# Patient Record
Sex: FEMALE | Race: WHITE
[De-identification: names, ages, dates, MRNs, and addresses within clinical notes are randomized per-mention and may not be internally consistent; named-entity substitution may affect disease eponyms.]

---

## 2020-07-30 ENCOUNTER — HOSPITAL ENCOUNTER (EMERGENCY)
Dept: HOSPITAL 11 - JP.ED | Age: 35
Discharge: HOME | End: 2020-07-30
Payer: COMMERCIAL

## 2020-07-30 DIAGNOSIS — R07.89: Primary | ICD-10-CM

## 2020-07-30 DIAGNOSIS — Z88.2: ICD-10-CM

## 2020-07-30 DIAGNOSIS — Z88.0: ICD-10-CM

## 2020-07-30 DIAGNOSIS — Z88.1: ICD-10-CM

## 2020-07-30 DIAGNOSIS — Z79.899: ICD-10-CM

## 2020-07-30 DIAGNOSIS — Z87.891: ICD-10-CM

## 2020-07-30 PROCEDURE — 36415 COLL VENOUS BLD VENIPUNCTURE: CPT

## 2020-07-30 PROCEDURE — 71046 X-RAY EXAM CHEST 2 VIEWS: CPT

## 2020-07-30 PROCEDURE — 85025 COMPLETE CBC W/AUTO DIFF WBC: CPT

## 2020-07-30 PROCEDURE — 93005 ELECTROCARDIOGRAM TRACING: CPT

## 2020-07-30 PROCEDURE — 84484 ASSAY OF TROPONIN QUANT: CPT

## 2020-07-30 PROCEDURE — 96372 THER/PROPH/DIAG INJ SC/IM: CPT

## 2020-07-30 PROCEDURE — 99285 EMERGENCY DEPT VISIT HI MDM: CPT

## 2020-07-30 PROCEDURE — 80053 COMPREHEN METABOLIC PANEL: CPT

## 2020-07-30 NOTE — CR
CHEST: 2 view

 

CLINICAL HISTORY:Chest pain

 

COMPARISON:None

 

FINDINGS:  The heart size, pulmonary vascularity and hilar structures are

normal. No infiltrate effusion or pneumothorax is seen.

 

IMPRESSION: No acute cardiopulmonary process.

## 2020-07-30 NOTE — EDM.PDOC
<JodiValeria ram M - Last Filed: 20 16:06>





ED HPI GENERAL MEDICAL PROBLEM





- General


Chief Complaint: Chest Pain


Stated Complaint: CHEST PAIN, SOB


Time Seen by Provider: 20 15:00


Source of Information: Reports: Patient, Family, RN, RN Notes Reviewed, 

Significant Other


History Limitations: Reports: No Limitations





- History of Present Illness


INITIAL COMMENTS - FREE TEXT/NARRATIVE: 





Patient here with spouse via private vehicle for onset of CP today. They are 

from out of state vacationing and have been participating in several different 

outdoor activities. Pt c/o radiation of pain down left arm and tingling in left 

hand. Denies pain to jaw. Describes a pressure in chest that makes it hard to 

take a deep breath. Reveals a history of angina and palpitation. Takes Verapamil

daily from PCP. Denies any other concerns. Verbalizes keeps hydrated. Pt appears

in no distress and able to provide appropriate answers.  


Onset: Today, Sudden


Onset Date: 20


Duration: Hour(s):, Constant


Location: Reports: Chest


Quality: Reports: Ache, Burning, Pressure


Severity: Moderate


Improves with: Reports: None


Associated Symptoms: Reports: Chest Pain, Diaphoresis, Fever/Chills, 

Nausea/Vomiting





- Related Data


                                    Allergies











Allergy/AdvReac Type Severity Reaction Status Date / Time


 


erythromycin base Allergy  Hives Verified 20 14:59


 


Penicillins Allergy  Hives Verified 20 14:59


 


Sulfa (Sulfonamide Allergy  Hives Verified 20 14:59





Antibiotics)     











Home Meds: 


                                    Home Meds





Verapamil HCl [Verapamil ER] 120 mg PO DAILY 20 [History]











Past Medical History


Cardiovascular History: Reports: Angina


Other Cardiovascular History: palpitations


OB/GYN History: Reports: Pregnancy





- Past Surgical History


HEENT Surgical History: Reports: Adenoidectomy, Tonsillectomy


GI Surgical History: Reports: Appendectomy


Female  Surgical History: Reports:  Section, Hysterectomy





Social & Family History





- Tobacco Use


Smoking Status *Q: Former Smoker


Used Tobacco, but Quit: No





- Caffeine Use


Caffeine Use: Reports: Coffee, Soda





- Recreational Drug Use


Recreational Drug Use: No





ED ROS GENERAL





- Review of Systems


Review Of Systems: See Below


Constitutional: Reports: Chills, Night Sweats


HEENT: Reports: No Symptoms


Respiratory: Reports: Shortness of Breath


Cardiovascular: Reports: Chest Pain, Dyspnea on Exertion, Edema (BL feet ), 

Lightheadedness


Endocrine: Reports: No Symptoms


GI/Abdominal: Reports: No Symptoms


: Reports: No Symptoms


Musculoskeletal: Reports: No Symptoms


Skin: Reports: No Symptoms


Neurological: Reports: No Symptoms


Psychiatric: Reports: No Symptoms


Hematologic/Lymphatic: Reports: No Symptoms


Immunologic: Reports: No Symptoms





ED EXAM, GENERAL





- Physical Exam


Exam: See Below


Exam Limited By: No Limitations


General Appearance: Alert, WD/WN


Head: Normocephalic


Neck: Normal Inspection


Respiratory/Chest: No Respiratory Distress, Lungs Clear, Normal Breath Sounds, 

No Accessory Muscle Use


Cardiovascular: Normal Peripheral Pulses, Regular Rate, Rhythm, No Murmur, No 

Rub


Peripheral Pulses: 2+: Radial (L), Radial (R), Dorsalis Pedis (L), Dorsalis 

Pedis (R)


 (Female) Exam: Deferred


Rectal (Female) Exam: Deferred


Neurological: Alert, Oriented, CN II-XII Intact, Normal Cognition


Psychiatric: Normal Affect, Normal Mood


Skin Exam: Warm, Diaphoretic





Course





- Re-Assessments/Exams


Free Text/Narrative Re-Assessment/Exam: 





20 15:23


Examin ot and take H&P


Orders for labs, meds, and dx. 


20 15:50


CBC and CXR WNL


20 15:56


BMP and trop negative. 





20 16:00


Spoke with pt and spouse. Gave results of labs and imaging. Offered zofran and 

Toradol. Declined. Has some at home. 





Departure





- Departure


Time of Disposition: 16:01


Disposition: Home, Self-Care 01


Condition: Good


Clinical Impression: 


 Chest pain of unknown etiology





Instructions:  Nonspecific Chest Pain, Adult, Easy-to-Read


Referrals: 


PCP,None [Primary Care Provider] - 


Forms:  ED Department Discharge


Additional Instructions: 


Your testing and diagnostics were great and all normal. 


Please take Zofran and analgesics as needed if chest wall pain re-occurs. 


Seek medical attention right away with shortness of breath, radiating pain to 

your jaw, down your arm, or shortness of breath. 


Drink plenty of fluids and manage your time if int he sun too much. 








Sepsis Event Note (ED)





- Evaluation


Sepsis Screening Result: No Definite Risk





<Romulo Adame - Last Filed: 20 07:45>





Course





- Vital Signs


Last Recorded V/S: 





                                Last Vital Signs











Temp  96.9 F   20 15:04


 


Pulse  58 L  20 15:37


 


Resp  13   20 15:04


 


BP  109/73   20 15:37


 


Pulse Ox  97   20 15:04














- Orders/Labs/Meds


Orders: 





                               Active Orders 24 hr











 Category Date Time Status


 


 EKG Documentation Completion [RC] ASDIRECTED Care  20 15:40 Active


 


 EKG 12 Lead [EK] Stat Ther  20 15:40 Ordered











Labs: 





                                Laboratory Tests











  20 Range/Units





  15:31 15:31 


 


WBC  6.8   (4.5-11.0)  K/uL


 


RBC  4.57   (3.30-5.50)  M/uL


 


Hgb  14.6   (12.0-15.0)  g/dL


 


Hct  42.6   (36.0-48.0)  %


 


MCV  93   (80-98)  fL


 


MCH  32 H   (27-31)  pg


 


MCHC  34   (32-36)  %


 


Plt Count  345   (150-400)  K/uL


 


Neut % (Auto)  57   (36-66)  %


 


Lymph % (Auto)  31   (24-44)  %


 


Mono % (Auto)  8 H   (2-6)  %


 


Eos % (Auto)  2   (2-4)  %


 


Baso % (Auto)  1   (0-1)  %


 


Sodium   141  (140-148)  mmol/L


 


Potassium   4.0  (3.6-5.2)  mmol/L


 


Chloride   106  (100-108)  mmol/L


 


Carbon Dioxide   26  (21-32)  mmol/L


 


Anion Gap   8.7  (5.0-14.0)  mmol/L


 


BUN   9  (7-18)  mg/dL


 


Creatinine   0.8  (0.6-1.0)  mg/dL


 


Est Cr Clr Drug Dosing   88.32  mL/min


 


Estimated GFR (MDRD)   > 60  (>60)  


 


Glucose   87  ()  mg/dL


 


Calcium   8.7  (8.5-10.1)  mg/dL


 


Total Bilirubin   1.2 H  (0.2-1.0)  mg/dL


 


AST   24  (15-37)  U/L


 


ALT   62  (12-78)  U/L


 


Alkaline Phosphatase   69  ()  U/L


 


Troponin I   < 0.017  (0.000-0.056)  ng/mL


 


Total Protein   6.7  (6.4-8.2)  g/dL


 


Albumin   4.0  (3.4-5.0)  g/dL


 


Globulin   2.7  (2.3-3.5)  g/dL


 


Albumin/Globulin Ratio   1.5  (1.2-2.2)  











Meds: 





Medications














Discontinued Medications














Generic Name Dose Route Start Last Admin





  Trade Name Yeny  PRN Reason Stop Dose Admin


 


Ketorolac Tromethamine  30 mg  20 15:13  20 15:18





  Toradol  IM  20 15:14  30 mg





  ONETIME ONE   Administration


 


Ondansetron HCl  4 mg  20 15:13  20 15:18





  Zofran Odt  PO  20 15:14  4 mg





  ONETIME ONE   Administration














Departure





- Departure


Time of Disposition: 16:35





- My Orders


Last 24 Hours: 





My Active Orders





20 15:40


EKG Documentation Completion [RC] ASDIRECTED 


EKG 12 Lead [EK] Stat 














- Assessment/Plan


Last 24 Hours: 





My Active Orders





20 15:40


EKG Documentation Completion [RC] ASDIRECTED 


EKG 12 Lead [EK] Stat 














Attestation - Student





- Attestation Statement


Attestation Statement: 


I personally performed or re-performed the physical examination and medical 

decision making. I have verified all student documentation or findings, 

including history, physical exam and/or medical decision making.